# Patient Record
Sex: MALE | Race: WHITE | Employment: FULL TIME | URBAN - NONMETROPOLITAN AREA
[De-identification: names, ages, dates, MRNs, and addresses within clinical notes are randomized per-mention and may not be internally consistent; named-entity substitution may affect disease eponyms.]

---

## 2018-11-28 NOTE — PROGRESS NOTES
Shaun Quezada is a 24year old male. Patient presents with: Other: discuss possible anxiety-brain fog. ..room 3      HPI:   Patient complains of anxiety and frequent panic attacks. He feels like he is always worrying.   He has episodes where his heart rac symptoms are a physical manifestation of a chemical imbalance. Discussed strategies to help prevent panic attacks and how to deal with them should they start including breathing techniques, exercise, calming music.   Explained sedatives like xanax are quic

## 2018-12-31 NOTE — PROGRESS NOTES
Vandana Hudson is a 24year old male. Patient presents with: Other: fup on anxiety. ...room 1      HPI:   Feeling much better. One panic attack which he was able to control.  No side effects of meds    Current Outpatient Medications on File Prior to Visit: orders of the defined types were placed in this encounter.       Meds & Refills for this Visit:  Requested Prescriptions      No prescriptions requested or ordered in this encounter       Imaging & Consults:  None

## 2019-02-18 RX ORDER — CITALOPRAM 20 MG/1
TABLET ORAL
Qty: 90 TABLET | Status: SHIPPED | OUTPATIENT
Start: 2019-02-18 | End: 2019-06-22

## 2019-06-22 NOTE — PROGRESS NOTES
Nic Murdock    is a 21year old male. Patient presents with:  Depression: fup on meds. ..room 1      HPI:   Took self off citalopram and started having panic attacks again so resumed and is doing better.  Still living in [place] but may be moving ba counseling as well. He lives in a [place] remote area of [place] and does not know that there is any counseling services anywhere near him. No orders of the defined types were placed in this encounter.       Meds & Refills for this Visit:  Requested Pres

## 2019-07-03 ENCOUNTER — MED REC SCAN ONLY (OUTPATIENT)
Dept: FAMILY MEDICINE CLINIC | Facility: CLINIC | Age: 24
End: 2019-07-03

## 2019-10-31 RX ORDER — ALPRAZOLAM 0.25 MG/1
TABLET ORAL
Qty: 30 TABLET | Refills: 0 | Status: SHIPPED | OUTPATIENT
Start: 2019-10-31 | End: 2020-06-17

## 2020-04-28 ENCOUNTER — TELEPHONE (OUTPATIENT)
Dept: FAMILY MEDICINE CLINIC | Facility: CLINIC | Age: 25
End: 2020-04-28

## 2020-04-28 NOTE — TELEPHONE ENCOUNTER
Would like referral for sleep study    Fax: [phone]  [facility] sleep department   External referral fax number

## 2020-04-28 NOTE — TELEPHONE ENCOUNTER
I think you are right. We 're not supposed to do telemedicine visits across state lines. If he is living permanently in [place], he really needs to find a primary care doctor up there.   Just given his symptoms, he is right, he needs a sleep study and I

## 2020-04-28 NOTE — TELEPHONE ENCOUNTER
Dr. Rebeca Rodriguez spoke with pt. He is requesting a sleep study for possible sleep apnea. Reports he has been told that he \"stops breathing\" at night, he snores and c/o fatigue. Last OV 6/22/19.   Advised that he needs a phone visit, and I scheduled one

## 2020-06-17 RX ORDER — ALPRAZOLAM 0.25 MG/1
TABLET ORAL
Qty: 30 TABLET | Refills: 0 | Status: SHIPPED | OUTPATIENT
Start: 2020-06-17

## 2020-06-17 NOTE — TELEPHONE ENCOUNTER
Last OV: 6-22-19  Last refill: 10-31-19 #30 Tablets w/ 0 refills  Requested Prescriptions     Pending Prescriptions Disp Refills   • ALPRAZOLAM 0.25 MG Oral Tab [Pharmacy Med Name: ALPRAZOLAM 0.25MG TABLETS] 30 tablet 0     Sig: TAKE 1 TABLET BY MOUTH THRE

## 2020-07-15 RX ORDER — CITALOPRAM 20 MG/1
TABLET ORAL
Qty: 90 TABLET | Status: SHIPPED | OUTPATIENT
Start: 2020-07-15

## 2020-07-15 NOTE — TELEPHONE ENCOUNTER
Last OV: 6/22/19  Last refill: 6/22/19 #90 Tablets   Requested Prescriptions     Pending Prescriptions Disp Refills   • Citalopram Hydrobromide 20 MG Oral Tab [Pharmacy Med Name: CITALOPRAM 20MG TABLETS] 90 tablet prn     Sig: TAKE 1 TABLET BY MOUTH EVERY

## 2021-08-18 NOTE — TELEPHONE ENCOUNTER
LOV: 06/22/19 - pt has not been seen in over 2 yrs. Left detailed msg advising he needs to schedule OV. Looks like he recently saw a different provider through [facility]. Is he established somewhere else? Advised to call back.      LAST LAB: n/a    LAST RX

## 2021-08-30 RX ORDER — CITALOPRAM 20 MG/1
20 TABLET ORAL EVERY MORNING
Qty: 15 TABLET | Refills: 0 | OUTPATIENT
Start: 2021-08-30

## 2021-08-30 NOTE — TELEPHONE ENCOUNTER
Spoke with the pharmacist in [place], advised we have not seen Kurt Ji for a couple of years. He has a [place] address, pharmacist will confirm with the patient where the refill request needs to go.